# Patient Record
Sex: FEMALE | Race: WHITE | NOT HISPANIC OR LATINO | URBAN - METROPOLITAN AREA
[De-identification: names, ages, dates, MRNs, and addresses within clinical notes are randomized per-mention and may not be internally consistent; named-entity substitution may affect disease eponyms.]

---

## 2020-02-24 ENCOUNTER — EMERGENCY (EMERGENCY)
Facility: HOSPITAL | Age: 55
LOS: 1 days | Discharge: HOME | End: 2020-02-24
Attending: EMERGENCY MEDICINE | Admitting: EMERGENCY MEDICINE
Payer: COMMERCIAL

## 2020-02-24 VITALS
RESPIRATION RATE: 16 BRPM | SYSTOLIC BLOOD PRESSURE: 123 MMHG | OXYGEN SATURATION: 98 % | WEIGHT: 184.97 LBS | DIASTOLIC BLOOD PRESSURE: 84 MMHG | TEMPERATURE: 98 F | HEART RATE: 75 BPM | HEIGHT: 68 IN

## 2020-02-24 VITALS
OXYGEN SATURATION: 98 % | RESPIRATION RATE: 14 BRPM | SYSTOLIC BLOOD PRESSURE: 118 MMHG | DIASTOLIC BLOOD PRESSURE: 50 MMHG | HEART RATE: 74 BPM

## 2020-02-24 DIAGNOSIS — O34.219 MATERNAL CARE FOR UNSPECIFIED TYPE SCAR FROM PREVIOUS CESAREAN DELIVERY: Chronic | ICD-10-CM

## 2020-02-24 PROCEDURE — 72040 X-RAY EXAM NECK SPINE 2-3 VW: CPT | Mod: 26

## 2020-02-24 PROCEDURE — 99284 EMERGENCY DEPT VISIT MOD MDM: CPT

## 2020-02-24 PROCEDURE — 73030 X-RAY EXAM OF SHOULDER: CPT

## 2020-02-24 PROCEDURE — 72040 X-RAY EXAM NECK SPINE 2-3 VW: CPT

## 2020-02-24 PROCEDURE — 99284 EMERGENCY DEPT VISIT MOD MDM: CPT | Mod: 25

## 2020-02-24 PROCEDURE — 73030 X-RAY EXAM OF SHOULDER: CPT | Mod: 26,RT

## 2020-02-24 RX ORDER — DIAZEPAM 5 MG
1 TABLET ORAL
Qty: 15 | Refills: 0
Start: 2020-02-24

## 2020-02-24 RX ORDER — OXYCODONE AND ACETAMINOPHEN 5; 325 MG/1; MG/1
1 TABLET ORAL ONCE
Refills: 0 | Status: DISCONTINUED | OUTPATIENT
Start: 2020-02-24 | End: 2020-02-24

## 2020-02-24 RX ORDER — IBUPROFEN 200 MG
600 TABLET ORAL ONCE
Refills: 0 | Status: COMPLETED | OUTPATIENT
Start: 2020-02-24 | End: 2020-02-24

## 2020-02-24 RX ADMIN — OXYCODONE AND ACETAMINOPHEN 1 TABLET(S): 5; 325 TABLET ORAL at 07:29

## 2020-02-24 RX ADMIN — Medication 600 MILLIGRAM(S): at 07:29

## 2020-02-24 NOTE — ED PROVIDER NOTE - OBJECTIVE STATEMENT
56 y/o F with hx of RA with c/o right shoulder pain radiating to right elbow/neck while sleeping at night.  pt denies obvious trauma.  Improved with immobilization and worse with movement.

## 2020-02-24 NOTE — ED PROVIDER NOTE - PENDING LAB RAD OPT OUT
n/a Exclude Pending Lab and Radiology orders from printing on the Patient's Discharge Instructions, due to Privacy Concerns.

## 2020-02-24 NOTE — ED ADULT NURSE NOTE - OBJECTIVE STATEMENT
Received the patient in the Er. Patient is alert and oriented. Skin warm and dry. C/O right shoulder pain. Arm sling applied.

## 2020-02-24 NOTE — ED PROVIDER NOTE - PATIENT PORTAL LINK FT
You can access the FollowMyHealth Patient Portal offered by [facility] by registering at the following website: [email or web address]. By joining [employer]’s FollowMyHealth portal, you will also be able to view your health information using other applications (apps) compatible with our system.

## 2020-02-24 NOTE — ED ADULT TRIAGE NOTE - MEANS OF ARRIVAL
Viral Syndrome (Child)  A virus is the most common cause of illness among children. This may cause a number of different symptoms, depending on what part of the body is affected. If the virus settles in the nose, throat, and lungs, it causes cough, congestion, and sometimes headache. If it settles in the stomach and intestinal tract, it causes vomiting and diarrhea. Sometimes it causes vague symptoms of \"feeling bad all over,\" with fussiness, poor appetite, poor sleeping, and lots of crying. A light rash may also appear for the first few days, then fade away.  A viral illness usually lasts 1 to 2 weeks, but sometimes it lasts longer. Home measures are all that are needed to treat a viral illness. Antibiotics don't help. Occasionally, a more serious bacterial infection can look like a viral syndrome in the first few days of the illness.   Home care  Follow these guidelines to care for your child at home:  · Fluids. Fever increases water loss from the body. For infants under 1 year old, continue regular feedings (formula or breast). Between feedings give oral rehydration solution, which is available from groceries and drugstores without a prescription. For children older than 1 year, give plenty of fluids like water, juice, ginger ale, lemonade, fruit-based drinks, or popsicles.    · Food. If your child doesn't want to eat solid foods, it's OK for a few days, as long as he or she drinks lots of fluid. (If your child has been diagnosed with a kidney disease, ask your child’s doctor how much and what types of fluids your child should drink to prevent dehydration. If your child has kidney disease, drinking too much fluid can cause it build up in the body and be dangerous to your child’s health.)  · Activity. Keep children with a fever at home resting or playing quietly. Encourage frequent naps. Your child may return to day care or school when the fever is gone and he or she is eating well and feeling  better.  · Sleep. Periods of sleeplessness and irritability are common. A congested child will sleep best with his or her head and upper body propped up on pillows or with the head of the bed frame raised on a 6-inch block. An infant may sleep in a car seat placed in the crib or in a baby swing.  · Cough. Coughing is a normal part of this illness. A cool mist humidifier at the bedside may be helpful. Over-the-counter (OTC) cough and cold medicine has not been proved to be any more helpful than sweet syrup with no medicine in it. But these medicines can produce serious side effects, especially in infants younger than 2 years. Don’t give OTC cough and cold medicines to children under age 6 years unless your doctor has specifically advised you to do so. Also, don’t expose your child to cigarette smoke. It can make the cough worse.  · Nasal congestion. Suction the nose of infants with a rubber bulb syringe. You may put 2 to 3 drops of saltwater (saline) nose drops in each nostril before suctioning to help remove secretions. Saline nose drops are available without a prescription. You can make it by adding 1/4 teaspoon table salt in 1 cup of water.  · Fever. You may give your child acetaminophen or ibuprofen to control pain and fever, unless another medicine was prescribed for this. If your child has chronic liver or kidney disease or ever had a stomach ulcer or GI bleeding, talk with your doctor before using these medicines. Do not give aspirin to anyone younger than 18 years who is ill with a fever. It may cause severe disease or death liver damage.  · Prevention. Wash your hands before and after touching your sick child to help prevent giving a new illness to your child and to prevent spreading this viral illness to yourself and to other children.  Follow-up care  Follow up with your child's healthcare provider as advised.  When to seek medical advice  Unless your child's health care provider advises otherwise, call  the provider right away if:  · Your child is 3 months old or younger and has a fever of 100.4°F (38°C) or higher. (Get medical care right away. Fever in a young baby can be a sign of a dangerous infection.)  · Your child is younger than 2 years of age and has a fever of 100.4°F (38°C) that continues for more than 1 day.  · Your child is 2 years old or older and has a fever of 100.4°F (38°C) that continues for more than 3 days.  · Your child is of any age and has repeated fevers above 104°F (40°C).  · Fussiness or crying that cannot be soothed  Also call for:  · Earache, sinus pain, stiff or painful neck, or headache Increasing abdominal pain or pain that is not getting better after 8 hours  · Repeated diarrhea or vomiting  · Appearance of a new rash  · Signs of dehydration: No wet diapers for 8 hours in infants, little or no urine older children, very dark urine, sunken eyes  · Burning when urinating  Call 911  Seek emergency medical care if any of the following occur:  · Lips or skin that turn blue, purple, or gray  · Neck stiffness or rash with a fever  · Convulsion (seizure)  · Wheezing or trouble breathing  · Unusual fussiness or drowsiness  · Confusion  © 6470-1388 [employer]. [address] [place] [address], [place] [address]. All rights reserved. This information is not intended as a substitute for professional medical care. Always follow your healthcare professional's instructions.      MEDICATION: ACETAMINOPHEN (TYLENOL) (Child)  Acetaminophen is a medication that treats fever and pain in children. It has no anti-inflammatory effect. Acetaminophen is sold as Tylenol, Tempra, and other brands. Acetaminophen comes in liquid and chewable tablets. Be sure to give the form appropriate for your child’s age. If you’re not sure which form you should give to your child, ask your child’s healthcare provider.  DIRECTIONS FOR USE  · Follow your child’s healthcare provider’s instructions about how to give your  child acetaminophen, especially if your child is younger than 2 years old.  · Use the measuring device that comes with the package for liquid acetaminophen.  · Give your child 1 dose every 4 hours as long as your child has symptoms. Do NOT give more than 5 doses in 24 hours. Giving more than the recommended doses can cause serious liver damage.  · To prevent choking, when giving your child chewable tablets, make sure he or she is sitting up. Instruct your child to chew the tablet completely and swallow before getting up.   Acetaminophen is given based on body weight and age. Unless your child’s healthcare provider tells you otherwise, you may use the table below as a guide. Important: Read and follow the directions on the package. Different brands of acetaminophen may have slightly different instructions.  Child’s Weight   (pounds) Child’s Age Infants’ Suspension Liquid   (160 mg / 5 mL) Children’s Suspension Liquid   (160 mg / 5 mL) Children’s Chewable Tablets   (80 mg each)  Faisal Chewable Tablets   (160 mg each)   6-11 0-3 mos Ask child’s healthcare provider.   12-17 4-11 mos       18-23 12-23 mos       24-35 2-3 yrs 5 mL 5 mL (1 tsp) 2 tablets Not recommended   36-47 4-5 yrs Not recommended 7.5 mL (1½ tsp) 3 tablets Not recommended   48-59 6-8 yrs Not recommended 10 mL (2 tsp) 4 tablets 2 tablets   60-71 9-10 yrs Not recommended 12.5 mL (2½ tsp) 5 tablets 2½ tablets   72-95 11 yrs Not recommended 15 mL (3 tsp) 6 tablets 3 tablets      ------------------  MEDICAL CONDITIONS  Before starting your child on acetaminophen, be sure your child’s healthcare provider knows if your child has any of the following:  · An allergy to acetaminophen or any of the ingredients in the product  · A serious medical condition, such as liver disease  · Phenylketonuria (PKU), an inherited condition that requires your child follow a special diet; some brands of acetaminophen have the sweetener aspartame that contains the chemical  phenylalanine, which can worsen PKU; check for aspartame by reading the ingredients listed on the package  DRUG INTERACTIONS  Before starting your child on acetaminophen, be sure your child’s healthcare provider knows if your child is taking other over-the-counter or prescribed medications, including any of the following:  · Blood-thinning medication, such as warfarin (Coumadin)  · Seizure medication  · Nausea medication  WARNINGS  · Before giving your child other over-the-counter medications, such as cold remedies, read the label to be sure that they do not also contain acetaminophen. An overdose of acetaminophen can cause liver damage.  · Stop giving your child acetaminophen and contact your child’s healthcare provider right away if any of the following occur:  ¨ Fever gets worse or lasts more than 3 days  ¨ Pain gets worse or lasts more than 5 days  ¨ Redness or swelling develops  ¨ New symptoms develop  · Sore throat warning: Stop giving acetaminophen if your child has a severe sore throat that lasts longer than 2 days, and also has a fever, headache, rash, nausea, or vomiting. These can be signs of a more serious illness. Contact your child’s healthcare provider right away.  · Do NOT give your child adult acetaminophen. The strength and dosing of the adult version are not appropriate for children and can cause serious health problems in your child.  [Note: This information may not include all instructions, precautions, medical conditions, drug/food interactions, and warnings for acetaminophen. Consult your doctor, nurse, or pharmacist if you have questions.]  © 2000-2012 [facility], [address] [place] [address], [place] [address]. All rights reserved. This information is not intended as a substitute for professional medical care. Always follow your healthcare professional's instructions.       ambulatory

## 2020-03-06 ENCOUNTER — EMERGENCY (EMERGENCY)
Facility: HOSPITAL | Age: 55
LOS: 1 days | Discharge: HOME | End: 2020-03-06
Attending: INTERNAL MEDICINE | Admitting: INTERNAL MEDICINE
Payer: COMMERCIAL

## 2020-03-06 DIAGNOSIS — O34.219 MATERNAL CARE FOR UNSPECIFIED TYPE SCAR FROM PREVIOUS CESAREAN DELIVERY: Chronic | ICD-10-CM

## 2020-03-06 PROCEDURE — 99283 EMERGENCY DEPT VISIT LOW MDM: CPT

## 2020-03-06 PROCEDURE — 99283 EMERGENCY DEPT VISIT LOW MDM: CPT | Mod: 25

## 2020-03-06 PROCEDURE — 73610 X-RAY EXAM OF ANKLE: CPT

## 2020-03-07 VITALS
SYSTOLIC BLOOD PRESSURE: 134 MMHG | OXYGEN SATURATION: 96 % | HEART RATE: 76 BPM | RESPIRATION RATE: 16 BRPM | DIASTOLIC BLOOD PRESSURE: 83 MMHG | HEIGHT: 68 IN | TEMPERATURE: 98 F | WEIGHT: 184.97 LBS

## 2020-03-07 VITALS
TEMPERATURE: 98 F | HEART RATE: 77 BPM | OXYGEN SATURATION: 96 % | SYSTOLIC BLOOD PRESSURE: 128 MMHG | RESPIRATION RATE: 18 BRPM | DIASTOLIC BLOOD PRESSURE: 82 MMHG

## 2020-03-07 PROBLEM — M06.9 RHEUMATOID ARTHRITIS, UNSPECIFIED: Chronic | Status: ACTIVE | Noted: 2020-02-24

## 2020-03-07 PROCEDURE — 73610 X-RAY EXAM OF ANKLE: CPT | Mod: 26,LT

## 2020-03-07 RX ORDER — HYDROXYCHLOROQUINE SULFATE 200 MG
1 TABLET ORAL
Qty: 0 | Refills: 0 | DISCHARGE

## 2020-03-07 RX ORDER — ASPIRIN/CALCIUM CARB/MAGNESIUM 324 MG
1 TABLET ORAL
Qty: 0 | Refills: 0 | DISCHARGE

## 2020-03-07 RX ORDER — HALOBETASOL PROPIONATE 0.5 MG/G
1 OINTMENT TOPICAL
Qty: 0 | Refills: 0 | DISCHARGE

## 2020-03-07 RX ORDER — LEVOTHYROXINE SODIUM 125 MCG
1 TABLET ORAL
Qty: 0 | Refills: 0 | DISCHARGE

## 2020-03-07 RX ORDER — DICLOFENAC SODIUM 75 MG/1
1 TABLET, DELAYED RELEASE ORAL
Qty: 0 | Refills: 0 | DISCHARGE

## 2020-03-07 NOTE — ED PROVIDER NOTE - CONSTITUTIONAL, MLM
normal... Well appearing, awake, alert, oriented to person, place, time/situation and in mild to moderate distress.

## 2020-03-07 NOTE — ED PROVIDER NOTE - PATIENT PORTAL LINK FT
You can access the FollowMyHealth Patient Portal offered by [facility] by registering at the following website: [email or web address]. By joining [employer]’s FollowMyHealth portal, you will also be able to view your health information using other applications (apps) compatible with our system.

## 2020-03-07 NOTE — ED ADULT NURSE NOTE - OBJECTIVE STATEMENT
54 y/o F patient presents to ED from home c/o left ankle pain today. Patient reports she had 2 beers at dinner and when walking down staircase she "rolled" left ankle down 3 steps. Patient denies head injury or LOC. Patient A&Ox4. skin warm and intact. redness and swelling noted to left ankle. Patient denies HA, dizziness, SOB, chest pain, abdominal pain, N/V/D, bowel/bladder changes, fevers/chills. Safety and comfort measures provided and maintained. Family bedside.

## 2020-03-07 NOTE — ED ADULT NURSE REASSESSMENT NOTE - NS ED NURSE REASSESS COMMENT FT1
Patient transported to the bathroom via wheelchair multiple times by RN.  Patient able to bear a little weight on affected extremity, steady gait noted.

## 2020-03-07 NOTE — ED ADULT NURSE NOTE - NSIMPLEMENTINTERV_GEN_ALL_ED
Implemented All Universal Safety Interventions:  [place] to call system. Call bell, personal items and telephone within reach. Instruct patient to call for assistance. Room bathroom lighting operational. Non-slip footwear when patient is off stretcher. Physically safe environment: no spills, clutter or unnecessary equipment. Stretcher in lowest position, wheels locked, appropriate side rails in place.

## 2020-03-07 NOTE — ED PROVIDER NOTE - NSFOLLOWUPINSTRUCTIONS_ED_ALL_ED_FT
Log Out.    [employer] CareNotes®     :  [facility]             SWOLLEN JOINT - AfterCare(R) Instructions(ER/ED)     Swollen Joint    WHAT YOU NEED TO KNOW:    Joint swelling may occur in one or more joints. You may have other symptoms, such as pain, tenderness, or stiffness. A swollen joint may be caused by a variety of conditions such as arthritis, pseudogout, gout, tendinitis, or injury.    DISCHARGE INSTRUCTIONS:    Return to the emergency department if:     You cannot move your joint at all.      You have severe pain that does not get better with medicine.     Contact your healthcare provider if:     You have a fever.       You have redness or warmth over the joint.       The swelling does not decrease with treatment.      You have questions or concerns about your condition or care.    Medicines:     NSAIDs, such as ibuprofen, help decrease swelling, pain, and fever. This medicine is available with or without a doctor's order. NSAIDs can cause stomach bleeding or kidney problems in certain people. If you take blood thinner medicine, always ask your healthcare provider if NSAIDs are safe for you. Always read the medicine label and follow directions.      Take your medicine as directed. Contact your healthcare provider if you think your medicine is not helping or if you have side effects. Tell him of her if you are allergic to any medicine. Keep a list of the medicines, vitamins, and herbs you take. Include the amounts, and when and why you take them. Bring the list or the pill bottles to follow-up visits. Carry your medicine list with you in case of an emergency.    Follow up with your healthcare provider as directed: Write down your questions so you remember to ask them during your visits.     Self-care:     Rest your swollen joint. Avoid activities that make the swelling or pain worse. You may need to avoid putting weight on your joint while you have pain. Crutches or a walker can be used to avoid putting weight on joints in your lower body.      Apply ice on your swollen joint for 15 to 20 minutes every hour or as directed. Use an ice pack, or put crushed ice in a plastic bag. Cover it with a towel. Ice helps prevent tissue damage and decreases swelling and pain.      Apply heat on your swollen joint for 20 to 30 minutes every 2 hours for as many days as directed. Heat helps decrease pain.      Elevate your swollen joint above the level of your heart as often as you can. This will help decrease swelling and pain. Prop your joint on pillows or blankets to keep it elevated comfortably.

## 2020-03-07 NOTE — ED PROVIDER NOTE - OBJECTIVE STATEMENT
rolled L ankle down 3 steps tonight   no meds taken PTA   no head injury  ankle pain/injury 54 y/o female c/c rolled L ankle down 3 steps tonight c/o left lateral ankle pain and swelling not applying full weight bearing

## 2020-03-07 NOTE — ED PROVIDER NOTE - CARE PROVIDER_API CALL
Cameron Spring (DO)  Orthopaedic Surgery  [address] [place] [address]  Phone: [phone]  Fax: [phone]  Follow Up Time: 1-3 Days

## 2022-02-16 NOTE — ED ADULT NURSE NOTE - NS ED NURSE LEVEL OF CONSCIOUSNESS SPEECH
Chief Complaint   Patient presents with    Injections     left knee euflexxa #2       Verbal and written consent was obtained by the patient. The following is a well known to me that is here for Euflexxa # 2. The knee was prepped in sterile fashion. Euflexxa 20 mg injected to Left knee. The patient tolerated the injections well and I will see the patient back in 1 week for repeat injections. Mili Velascol was seen today for injections.     Diagnoses and all orders for this visit:    Primary osteoarthritis of left knee  -     20610 - GA DRAIN/INJECT LARGE JOINT/BURSA    Other orders  -     sodium hyaluronate (EUFLEXXA, HYALGAN) injection 20 mg Speaking Coherently

## 2022-11-29 NOTE — ED ADULT NURSE NOTE - CAS ELECT INFOMATION PROVIDED
Detail Level: Zone
Initiate Treatment: Wet wraps once daily for one week
Continue Regimen: Cetirizine 10mg daily
DC instructions

## 2023-04-19 NOTE — ED ADULT NURSE NOTE - CAS EDP DISCH TYPE
Health Call Center    Phone Message    May a detailed message be left on voicemail: yes     Reason for Call: Appointment Intake    Referring Provider Name: Leland Oralia  Diagnosis and/or Symptoms: microscopic hematuria    Pt referred for above. Adam patient. Pt scheduled for first opening (for both vv and in person) on 9/14/23 and added to wait list. Pt requesting earlier appt if possible. Pt is open to other providers in [place]. Sending to clinic for review. Please follow-up with patient.    Action Taken: Message routed to:  [facility] Urology     Travel Screening: Not Applicable                                                                      
Home

## 2024-01-26 NOTE — ED PROVIDER NOTE - MUSCULOSKELETAL, MLM
Structured MSE
left ankle, lateral  joint tenderness, passive motion with discomfort, not weight bearing  NVE intact